# Patient Record
Sex: FEMALE | Race: WHITE | NOT HISPANIC OR LATINO | URBAN - NONMETROPOLITAN AREA
[De-identification: names, ages, dates, MRNs, and addresses within clinical notes are randomized per-mention and may not be internally consistent; named-entity substitution may affect disease eponyms.]

---

## 2021-09-02 ENCOUNTER — IMPORTED ENCOUNTER (OUTPATIENT)
Dept: URBAN - NONMETROPOLITAN AREA CLINIC 1 | Facility: CLINIC | Age: 81
End: 2021-09-02

## 2021-09-02 PROBLEM — H43.813: Noted: 2017-02-27

## 2021-09-02 PROBLEM — Z96.1: Noted: 2017-02-27

## 2021-09-02 PROBLEM — H00.025: Noted: 2021-09-02

## 2021-09-02 PROBLEM — H02.831: Noted: 2017-03-29

## 2021-09-02 PROBLEM — H02.834: Noted: 2017-03-29

## 2021-09-02 PROCEDURE — 99204 OFFICE O/P NEW MOD 45 MIN: CPT

## 2021-09-15 ENCOUNTER — IMPORTED ENCOUNTER (OUTPATIENT)
Dept: URBAN - NONMETROPOLITAN AREA CLINIC 1 | Facility: CLINIC | Age: 81
End: 2021-09-15

## 2021-09-15 PROCEDURE — 92012 INTRM OPH EXAM EST PATIENT: CPT

## 2021-09-15 NOTE — PATIENT DISCUSSION
"Hordeolum LLL - Discussed diagnosis in detail with patient - Patient states has been there for 8 weeks w/ no improvment w/ oral antibiotics and hot compresses. Recommend surgical incision and discussed RBA's and pt wishes to proceed with excision. 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 - The risk of retinal detachment in patients with PVDs was discussed with the patient and the warning signs of retinal detachment were carefully reviewed with the patient. - The patient was warned to return to the office or contact the ophthalmologist on call immediately if they experience signs of retinal detachment. ""- Continue to monitorDermatochalasis OU- Discussed diagnosis in detail with patient- No superior field of vision loss- Continue to monitor; 's Notes: Dr. Jovanny Rain Referral"

## 2021-09-17 ENCOUNTER — IMPORTED ENCOUNTER (OUTPATIENT)
Dept: URBAN - NONMETROPOLITAN AREA CLINIC 1 | Facility: CLINIC | Age: 81
End: 2021-09-17

## 2021-09-17 PROCEDURE — 67700 BLEPHAROTOMY DRG ABSC EYELID: CPT

## 2021-09-17 NOTE — PATIENT DISCUSSION
"Hordeolum LLL - Discussed diagnosis in detail with patient - Patient states has been there for 8 weeks w/ no improvment w/ oral antibiotics and hot compresses. Recommend surgical incision and discussed RBA's and pt wishes to proceed with excision. 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 - The risk of retinal detachment in patients with PVDs was discussed with the patient and the warning signs of retinal detachment were carefully reviewed with the patient. - The patient was warned to return to the office or contact the ophthalmologist on call immediately if they experience signs of retinal detachment. ""- Continue to monitorDermatochalasis OU- Discussed diagnosis in detail with patient- No superior field of vision loss- Continue to monitor; 's Notes: Dr. Misha Hernandez Referral"

## 2021-09-24 ENCOUNTER — IMPORTED ENCOUNTER (OUTPATIENT)
Dept: URBAN - NONMETROPOLITAN AREA CLINIC 1 | Facility: CLINIC | Age: 81
End: 2021-09-24

## 2021-09-24 PROBLEM — Z98.890: Noted: 2021-09-24

## 2021-09-24 PROBLEM — H02.831: Noted: 2017-03-29

## 2021-09-24 PROBLEM — H43.813: Noted: 2017-02-27

## 2021-09-24 PROBLEM — H02.834: Noted: 2017-03-29

## 2021-09-24 PROBLEM — Z96.1: Noted: 2017-02-27

## 2021-09-24 PROCEDURE — 99024 POSTOP FOLLOW-UP VISIT: CPT

## 2021-09-24 NOTE — PATIENT DISCUSSION
"1 week s/p excision of Hordeolum LLL - Discussed diagnosis in detail with patient - Healing well- D/C e-mycin martita- Continue to monitor. 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 - The risk of retinal detachment in patients with PVDs was discussed with the patient and the warning signs of retinal detachment were carefully reviewed with the patient. - The patient was warned to return to the office or contact the ophthalmologist on call immediately if they experience signs of retinal detachment. ""- Continue to monitorDermatochalasis OU- Discussed diagnosis in detail with patient- No superior field of vision loss- Continue to monitor"

## 2022-03-29 ENCOUNTER — EMERGENCY VISIT (OUTPATIENT)
Dept: URBAN - NONMETROPOLITAN AREA CLINIC 1 | Facility: CLINIC | Age: 82
End: 2022-03-29

## 2022-03-29 DIAGNOSIS — H00.014: ICD-10-CM

## 2022-03-29 PROCEDURE — 92012 INTRM OPH EXAM EST PATIENT: CPT

## 2022-03-29 RX ORDER — CEPHALEXIN 500 MG/1: 1 CAPSULE ORAL TWICE A DAY

## 2022-03-29 ASSESSMENT — TONOMETRY
OD_IOP_MMHG: 12
OS_IOP_MMHG: 12

## 2022-03-29 ASSESSMENT — VISUAL ACUITY
OS_SC: 20/25-1
OD_SC: 20/29

## 2022-03-29 NOTE — PATIENT DISCUSSION
Recommend Hot compresses 10-15 mins on and 45 mins off. Start Keflex 500mg BID x 7 days, RX sent to pharmacy. Continue to monitor.

## 2022-04-09 ASSESSMENT — VISUAL ACUITY
OD_SC: 20/40-2
OS_SC: 20/40-
OS_SC: 20/25
OD_SC: 20/30
OD_PH: 20/30
OS_SC: 20/30-2
OD_PH: 20/30
OD_SC: 20/40-2

## 2022-04-09 ASSESSMENT — TONOMETRY
OD_IOP_MMHG: 14
OD_IOP_MMHG: 15
OS_IOP_MMHG: 15
OS_IOP_MMHG: 16